# Patient Record
Sex: MALE | Race: OTHER
[De-identification: names, ages, dates, MRNs, and addresses within clinical notes are randomized per-mention and may not be internally consistent; named-entity substitution may affect disease eponyms.]

---

## 2018-07-25 ENCOUNTER — HOSPITAL ENCOUNTER (EMERGENCY)
Dept: HOSPITAL 12 - ER | Age: 48
Discharge: HOME | End: 2018-07-25
Payer: MEDICAID

## 2018-07-25 VITALS — SYSTOLIC BLOOD PRESSURE: 114 MMHG | DIASTOLIC BLOOD PRESSURE: 72 MMHG

## 2018-07-25 VITALS — WEIGHT: 160 LBS | BODY MASS INDEX: 25.71 KG/M2 | HEIGHT: 66 IN
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
## 2018-07-25 DIAGNOSIS — F17.200: ICD-10-CM
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
## 2018-07-25 DIAGNOSIS — Z90.49: ICD-10-CM

## 2018-07-25 DIAGNOSIS — F12.10: ICD-10-CM
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
## 2018-07-25 DIAGNOSIS — S86.891A: Primary | ICD-10-CM

## 2018-07-25 PROCEDURE — A4663 DIALYSIS BLOOD PRESSURE CUFF: HCPCS

## 2019-07-25 ENCOUNTER — HOSPITAL ENCOUNTER (EMERGENCY)
Dept: HOSPITAL 12 - ER | Age: 49
Discharge: HOME | End: 2019-07-25
Payer: SELF-PAY

## 2019-07-25 VITALS — HEIGHT: 67 IN | WEIGHT: 178 LBS | BODY MASS INDEX: 27.94 KG/M2

## 2019-07-25 VITALS — SYSTOLIC BLOOD PRESSURE: 104 MMHG | DIASTOLIC BLOOD PRESSURE: 60 MMHG

## 2019-07-25 DIAGNOSIS — F12.10: ICD-10-CM

## 2019-07-25 DIAGNOSIS — F17.200: ICD-10-CM

## 2019-07-25 DIAGNOSIS — H10.9: Primary | ICD-10-CM

## 2019-07-25 DIAGNOSIS — Z90.49: ICD-10-CM

## 2019-07-25 PROCEDURE — A4663 DIALYSIS BLOOD PRESSURE CUFF: HCPCS

## 2019-12-13 ENCOUNTER — EMERGENCY (EMERGENCY)
Facility: HOSPITAL | Age: 49
LOS: 1 days | Discharge: HOME | End: 2019-12-13
Attending: EMERGENCY MEDICINE
Payer: SELF-PAY

## 2019-12-13 VITALS
TEMPERATURE: 99 F | DIASTOLIC BLOOD PRESSURE: 78 MMHG | WEIGHT: 145.06 LBS | HEART RATE: 85 BPM | HEIGHT: 64 IN | SYSTOLIC BLOOD PRESSURE: 131 MMHG | RESPIRATION RATE: 16 BRPM | OXYGEN SATURATION: 98 %

## 2019-12-13 PROCEDURE — 99282 EMERGENCY DEPT VISIT SF MDM: CPT

## 2019-12-13 PROCEDURE — 99285 EMERGENCY DEPT VISIT HI MDM: CPT

## 2019-12-13 NOTE — ED PROVIDER NOTE - PATIENT PORTAL LINK FT
You can access the FollowMyHealth Patient Portal offered by [facility] by registering at the following website: [email or web address]. By joining [employer]’s FollowMyHealth portal, you will also be able to view your health information using other applications (apps) compatible with our system.

## 2019-12-13 NOTE — ED PROVIDER NOTE - CLINICAL SUMMARY MEDICAL DECISION MAKING FREE TEXT BOX
Patient brought in for medical evaluation after being found sleeping in [facility] after drinking ETOH  and smoking marijuana.  Patient now clinically sober. Will discharge with return precautions.

## 2019-12-13 NOTE — ED PROVIDER NOTE - OBJECTIVE STATEMENT
49 year old male with no known PMHx or PSHx presents to the ED via EMS after being found asleep in [facility] by authorities earlier today. Patient reports that he had a beer and smoked marijuana before [place] at the airport, thus missing his flight to [place]. Patient otherwise denies any symptoms and injuries, and denies any other drug use. Per ED, patient's only concern is the rescheduling of his flight. BAY.

## 2019-12-13 NOTE — ED ADULT NURSE NOTE - OBJECTIVE STATEMENT
I drank a beer and fell asleep in front of the airport. I have a flight at 1545 to [place]. DENIES FALL, DENIES HITTING HEAD.

## 2019-12-13 NOTE — CHART NOTE - NSCHARTNOTEFT_GEN_A_CORE
Consult received for positive SBIRT. Pt is a 49 year old male with no known PMHx . Pt was brought to the ED via EMS after being found asleep in [facility] . Pt  reported that he had a beer and smoked marijuana before falling asleep at the airport, thus, missing his flight to [place]. Ann met with Pt when he had sobered up, he appeared alert and orientedx3. Pt denied having problems with alcohol nor marijuana., thus, he refused SBIRT screening. Pt denied any other drug use, Pt's only concern is rescheduling his flight to [place]. Pt was given a note regarding his visit to the ED by the Physician. Ann also provided him with her contact information. Pt is uninsured, referral made to the finance dept. Pt was socially cleared.

## 2021-06-17 ENCOUNTER — HOSPITAL ENCOUNTER (EMERGENCY)
Dept: HOSPITAL 54 - ER | Age: 51
Discharge: HOME | End: 2021-06-17
Payer: COMMERCIAL

## 2021-06-17 VITALS — DIASTOLIC BLOOD PRESSURE: 67 MMHG | SYSTOLIC BLOOD PRESSURE: 122 MMHG

## 2021-06-17 VITALS — HEIGHT: 67 IN | WEIGHT: 173 LBS | BODY MASS INDEX: 27.15 KG/M2

## 2021-06-17 DIAGNOSIS — Z98.890: ICD-10-CM

## 2021-06-17 DIAGNOSIS — Z60.2: ICD-10-CM

## 2021-06-17 DIAGNOSIS — L97.119: Primary | ICD-10-CM

## 2021-06-17 DIAGNOSIS — F17.200: ICD-10-CM

## 2021-06-17 PROCEDURE — 99283 EMERGENCY DEPT VISIT LOW MDM: CPT

## 2021-06-17 PROCEDURE — A6403 STERILE GAUZE>16 <= 48 SQ IN: HCPCS

## 2021-06-17 SDOH — SOCIAL STABILITY - SOCIAL INSECURITY: PROBLEMS RELATED TO LIVING ALONE: Z60.2

## 2021-06-17 NOTE — NUR
[id number], FOR RIGHT LEG PAIN, PRESENTED WITH A R LEG WOUND PS 10/10. PEDAL 
PULSES PRESENT. NO S/S POOR CIRCULATION NOTED. WILL CONTINUE TO MONITOR THE 
PATIENT

## 2021-08-24 NOTE — ED PROVIDER NOTE - ENMT NEGATIVE STATEMENT, MLM
23 Ears: no ear pain and no hearing problems.Nose: no nasal congestion and no nasal drainage.Mouth/Throat: no dysphagia, no hoarseness and no throat pain.Neck: no lumps, no pain, no stiffness and no swollen glands.

## 2024-11-09 NOTE — ED ADULT NURSE NOTE - CAS DISCH CONDITION
Use the splint for comfort.  Use Tylenol or Motrin to help with pain.  Ice can also help in the first couple of days.  If you are still having a lot of pain in 1 week from now have repeat x-rays done either with your pediatrician or here to see if there is a small fracture that is healing.  
Stable